# Patient Record
Sex: FEMALE | Race: WHITE | Employment: FULL TIME | URBAN - METROPOLITAN AREA
[De-identification: names, ages, dates, MRNs, and addresses within clinical notes are randomized per-mention and may not be internally consistent; named-entity substitution may affect disease eponyms.]

---

## 2017-11-10 ENCOUNTER — OFFICE VISIT (OUTPATIENT)
Dept: OBGYN CLINIC | Facility: CLINIC | Age: 37
End: 2017-11-10

## 2017-11-10 VITALS
WEIGHT: 168 LBS | HEIGHT: 67.5 IN | DIASTOLIC BLOOD PRESSURE: 80 MMHG | BODY MASS INDEX: 26.06 KG/M2 | SYSTOLIC BLOOD PRESSURE: 132 MMHG

## 2017-11-10 DIAGNOSIS — Z01.419 WOMEN'S ANNUAL ROUTINE GYNECOLOGICAL EXAMINATION: Primary | ICD-10-CM

## 2017-11-10 DIAGNOSIS — Z80.3 FAMILY HISTORY OF BREAST CANCER IN MOTHER: ICD-10-CM

## 2017-11-10 PROCEDURE — 88175 CYTOPATH C/V AUTO FLUID REDO: CPT | Performed by: OBSTETRICS & GYNECOLOGY

## 2017-11-10 PROCEDURE — 99385 PREV VISIT NEW AGE 18-39: CPT | Performed by: OBSTETRICS & GYNECOLOGY

## 2017-11-10 PROCEDURE — 87624 HPV HI-RISK TYP POOLED RSLT: CPT | Performed by: OBSTETRICS & GYNECOLOGY

## 2017-11-10 NOTE — PROGRESS NOTES
GYN H&P     11/10/2017  8:17 AM    CC: Patient is here to establish care    HPI: Patient is a 29year old  LMP here for first PAP and to discuss occasional skipped cycles x 2 last year that have now returned to normal. Never sexually active so no pr unlabored  CARDIOVASCULAR: no peripheral edema or varicosities, skin warm and dry  BREASTS: bilaterally nontender, no palpable masses, no nipple discharge, no skin changes, no axillary adenopathy  ABDOMEN: Soft, non distended; non tender, no masses  GYNE/G

## 2019-04-16 ENCOUNTER — OFFICE VISIT (OUTPATIENT)
Dept: OBGYN CLINIC | Facility: CLINIC | Age: 39
End: 2019-04-16
Payer: COMMERCIAL

## 2019-04-16 ENCOUNTER — HOSPITAL ENCOUNTER (OUTPATIENT)
Dept: MAMMOGRAPHY | Age: 39
Discharge: HOME | End: 2019-04-16
Attending: OBSTETRICS & GYNECOLOGY
Payer: COMMERCIAL

## 2019-04-16 VITALS
HEIGHT: 67.5 IN | WEIGHT: 173.63 LBS | BODY MASS INDEX: 26.93 KG/M2 | SYSTOLIC BLOOD PRESSURE: 110 MMHG | DIASTOLIC BLOOD PRESSURE: 60 MMHG

## 2019-04-16 DIAGNOSIS — Z80.3 FAMILY HISTORY OF BREAST CANCER IN SISTER: ICD-10-CM

## 2019-04-16 DIAGNOSIS — Z80.3 FAMILY HISTORY OF BREAST CANCER IN MOTHER: ICD-10-CM

## 2019-04-16 DIAGNOSIS — Z01.419 WOMEN'S ANNUAL ROUTINE GYNECOLOGICAL EXAMINATION: Primary | ICD-10-CM

## 2019-04-16 PROCEDURE — 77063 BREAST TOMOSYNTHESIS BI: CPT | Performed by: OBSTETRICS & GYNECOLOGY

## 2019-04-16 PROCEDURE — 99395 PREV VISIT EST AGE 18-39: CPT | Performed by: OBSTETRICS & GYNECOLOGY

## 2019-04-16 PROCEDURE — 77067 SCR MAMMO BI INCL CAD: CPT | Performed by: OBSTETRICS & GYNECOLOGY

## 2019-04-16 NOTE — PROGRESS NOTES
GYN ANNUAL    2019  9:14 AM    CC:Patient presents for well woman visit    HPI: Patient is a 41year old  LMP 2019 here for annual gyn exam and order for mammogram. Cycles regular. No pelvic pain. No abnormal vaginal discharge or bleeding. denies dysuria, increased urinary frequency  Hematological/lymphatic: no complaints  Breast: denies rashes, skin changes, pain, lumps or discharge   Psychiatric: no complaints  Endocrine:no complaints  Neurological: no complaints  Immunological: no complai

## 2019-05-22 ENCOUNTER — HOSPITAL ENCOUNTER (OUTPATIENT)
Dept: ULTRASOUND IMAGING | Facility: HOSPITAL | Age: 39
Discharge: HOME | End: 2019-05-22
Attending: OBSTETRICS & GYNECOLOGY
Payer: COMMERCIAL

## 2019-05-22 ENCOUNTER — HOSPITAL ENCOUNTER (OUTPATIENT)
Dept: MAMMOGRAPHY | Facility: HOSPITAL | Age: 39
Discharge: HOME | End: 2019-05-22
Attending: OBSTETRICS & GYNECOLOGY
Payer: COMMERCIAL

## 2019-05-22 DIAGNOSIS — R92.8 ABNORMAL MAMMOGRAM: ICD-10-CM

## 2019-05-22 PROCEDURE — 77066 DX MAMMO INCL CAD BI: CPT | Performed by: OBSTETRICS & GYNECOLOGY

## 2019-05-22 PROCEDURE — 77062 BREAST TOMOSYNTHESIS BI: CPT | Performed by: OBSTETRICS & GYNECOLOGY

## 2019-05-22 PROCEDURE — 76642 ULTRASOUND BREAST LIMITED: CPT | Performed by: OBSTETRICS & GYNECOLOGY

## 2019-05-22 NOTE — IMAGING NOTE
This nurse introduced self and role of breast coordinator. Discussed recommended breast biopsy with patient. Pt was recommended by Dr Chloe Hearn to have a left  breast ultrasound guided biopsy.    Pt history discussed as below: started screening due to stron patient Radiology’s protocol regarding medications, as well as over-the-counter vitamin or herbal supplements, as follows:   -All herbal supplements, Vitamin E, Fish Oil  green tea ,all nsaids like   ibuprofen, Motrin, Advil, Aleve, or other anti-inflammat 5 daps post procedure until the incision is closed and healed. Educated patient on lifting restrictions – nothing heavier than a gallon of milk for 24-48 hours after the procedure.       Discussed with patient that some soreness and bruising is normal aft

## 2019-05-23 ENCOUNTER — TELEPHONE (OUTPATIENT)
Dept: OBGYN CLINIC | Facility: CLINIC | Age: 39
End: 2019-05-23

## 2019-05-23 DIAGNOSIS — N63.0 LUMP OR MASS IN BREAST: Primary | ICD-10-CM

## 2019-05-23 NOTE — PROGRESS NOTES
Spoke with pt mammogram results given and explained to pt that she needs Left breast BX that was ordered by Dr Tierney Richardson. Pt voiced understanding and states she will call [place] to schedule appointment.

## 2019-05-23 NOTE — TELEPHONE ENCOUNTER
----- Message from Dereje Salmon MD sent at 5/22/2019  3:39 PM CDT -----  Your mammogram shows a left breast mass requiring further evaluation. Please proceed with scheduling the recommended left breast biopsy which I have ordered.       Mark Landin

## 2019-05-30 ENCOUNTER — HOSPITAL ENCOUNTER (OUTPATIENT)
Dept: ULTRASOUND IMAGING | Facility: HOSPITAL | Age: 39
Discharge: HOME | End: 2019-05-30
Attending: OBSTETRICS & GYNECOLOGY
Payer: COMMERCIAL

## 2019-05-30 ENCOUNTER — HOSPITAL ENCOUNTER (OUTPATIENT)
Dept: MAMMOGRAPHY | Facility: HOSPITAL | Age: 39
Discharge: HOME | End: 2019-05-30
Attending: OBSTETRICS & GYNECOLOGY
Payer: COMMERCIAL

## 2019-05-30 VITALS — DIASTOLIC BLOOD PRESSURE: 67 MMHG | SYSTOLIC BLOOD PRESSURE: 119 MMHG | HEART RATE: 70 BPM

## 2019-05-30 DIAGNOSIS — N63.20 LEFT BREAST MASS: ICD-10-CM

## 2019-05-30 PROCEDURE — 77065 DX MAMMO INCL CAD UNI: CPT | Performed by: OBSTETRICS & GYNECOLOGY

## 2019-05-30 PROCEDURE — 19083 BX BREAST 1ST LESION US IMAG: CPT | Performed by: OBSTETRICS & GYNECOLOGY

## 2019-05-30 PROCEDURE — 88305 TISSUE EXAM BY PATHOLOGIST: CPT | Performed by: OBSTETRICS & GYNECOLOGY

## 2019-05-30 NOTE — PROCEDURES
[facility]  Procedure Note    Johnna Foster Patient Status:  Outpatient    1980 MRN [id number]   Location [facility] [address] Attending Linda Watkins MD   Hosp Day # 0 PCP None Pcp     Procedure: Left yasmany

## 2019-05-31 NOTE — IMAGING NOTE
. This  nurse  Called at  [address] am contacted [facility]  and informed her  of the following results and recommendations Surgical consultation is recommended given that the size of this mass measures 4.5 cm.          from Dr Jonna Martinez radiologist.  See consultation is recommended given that the size of this mass measures 4.5 cm.       Per report 5/22/2019, given benign histopathology results, 6 month bilateral breast sonographic follow-up is recommended to re-evaluate for interval stability of the probabl

## 2019-06-04 DIAGNOSIS — D24.9 FIBROADENOMA OF BREAST, UNSPECIFIED LATERALITY: Primary | ICD-10-CM

## 2019-06-04 NOTE — PROGRESS NOTES
Results given to pt, per Dr Savage Russian order for breast usn and Dr Dragan Srivastava done in [facility]. Pt states she will call Dr Dragan Srivastava to schedule appointment.

## 2019-07-22 NOTE — PROGRESS NOTES
Breast Surgery New Patient Consultation    This is the first visit for this 41year old woman, referred by Dr. Arron Cruz, who presents for evaluation of left breast mass.     History of Present Illness:   Ms. Kim Grubbs is a 41year old woman wh LEFT Left 2019    Left Fibroadenoma   • WISDOM TEETH REMOVED         Gynecological History:  Pt is a   She achieved menarche at age 17 and LMP 20  She denies any history of oral contraceptive use.   She denies infertility treatment to achieve chest pressure/discomfort, palpitations, irregular heartbeat, fainting or near-fainting, difficulty breathing when lying flat, SOB/Coughing at night, swelling of the legs or chest pain while walking.     Breasts:  See history of present illness    Gastroint kg/m²     Physical Exam:  The patient is an alert, oriented, well-nourished and  well-developed woman who appears her stated age. Her speech patterns and movements are normal. Her affect is appropriate. HEENT: The head is normocephalic.  The neck is supp of breast cancer. Discussion and Plan:  I had a discussion with the Patient regarding her breast exam. On exam today I found a dominant lesion in the left breast at the site of recent biopsy with no other clinical findings bilaterally.   I personally rev

## 2019-07-24 ENCOUNTER — OFFICE VISIT (OUTPATIENT)
Dept: SURGERY | Facility: CLINIC | Age: 39
End: 2019-07-24
Payer: COMMERCIAL

## 2019-07-24 VITALS
SYSTOLIC BLOOD PRESSURE: 138 MMHG | HEIGHT: 67.5 IN | RESPIRATION RATE: 16 BRPM | HEART RATE: 76 BPM | WEIGHT: 174 LBS | DIASTOLIC BLOOD PRESSURE: 93 MMHG | BODY MASS INDEX: 26.99 KG/M2 | OXYGEN SATURATION: 100 %

## 2019-07-24 DIAGNOSIS — N63.20 MASSES OF BOTH BREASTS: ICD-10-CM

## 2019-07-24 DIAGNOSIS — D24.2 FIBROADENOMA, LEFT: Primary | ICD-10-CM

## 2019-07-24 DIAGNOSIS — D24.2 FIBROADENOMA OF BREAST, LEFT: Primary | ICD-10-CM

## 2019-07-24 DIAGNOSIS — N63.10 MASSES OF BOTH BREASTS: ICD-10-CM

## 2019-07-24 PROCEDURE — 99244 OFF/OP CNSLTJ NEW/EST MOD 40: CPT | Performed by: SURGERY

## 2019-07-24 RX ORDER — LORATADINE 10 MG/1
10 TABLET ORAL
COMMUNITY

## 2019-07-24 NOTE — PATIENT INSTRUCTIONS
Surgeon: Dr Parish Reynoso  [phone]  Fax [phone]  Procedure/Surgery: left breast excisional biopsy    [address] [phone]  1.  Someone must accompany you the day of the procedure to drive you

## 2019-08-01 RX ORDER — METOCLOPRAMIDE 10 MG/1
10 TABLET ORAL ONCE
Status: CANCELLED | OUTPATIENT
Start: 2019-08-01 | End: 2019-08-01

## 2019-08-01 RX ORDER — FAMOTIDINE 20 MG/1
20 TABLET ORAL ONCE
Status: CANCELLED | OUTPATIENT
Start: 2019-08-01 | End: 2019-08-01

## 2019-08-05 ENCOUNTER — HOSPITAL ENCOUNTER (OUTPATIENT)
Facility: HOSPITAL | Age: 39
Setting detail: HOSPITAL OUTPATIENT SURGERY
Discharge: HOME | End: 2019-08-05
Attending: SURGERY | Admitting: SURGERY
Payer: COMMERCIAL

## 2019-08-05 ENCOUNTER — ANESTHESIA EVENT (OUTPATIENT)
Dept: SURGERY | Facility: HOSPITAL | Age: 39
End: 2019-08-05
Payer: COMMERCIAL

## 2019-08-05 ENCOUNTER — HOSPITAL ENCOUNTER (OUTPATIENT)
Dept: MAMMOGRAPHY | Facility: HOSPITAL | Age: 39
Discharge: HOME | End: 2019-08-05
Attending: SURGERY
Payer: COMMERCIAL

## 2019-08-05 ENCOUNTER — ANESTHESIA (OUTPATIENT)
Dept: SURGERY | Facility: HOSPITAL | Age: 39
End: 2019-08-05
Payer: COMMERCIAL

## 2019-08-05 VITALS
SYSTOLIC BLOOD PRESSURE: 122 MMHG | RESPIRATION RATE: 18 BRPM | TEMPERATURE: 98 F | WEIGHT: 171 LBS | HEART RATE: 60 BPM | DIASTOLIC BLOOD PRESSURE: 76 MMHG | OXYGEN SATURATION: 100 % | BODY MASS INDEX: 26.84 KG/M2 | HEIGHT: 67 IN

## 2019-08-05 DIAGNOSIS — D24.2 FIBROADENOMA OF BREAST, LEFT: ICD-10-CM

## 2019-08-05 DIAGNOSIS — D24.2 FIBROADENOMA OF LEFT BREAST: ICD-10-CM

## 2019-08-05 LAB — B-HCG UR QL: NEGATIVE

## 2019-08-05 PROCEDURE — 81025 URINE PREGNANCY TEST: CPT

## 2019-08-05 PROCEDURE — 88307 TISSUE EXAM BY PATHOLOGIST: CPT | Performed by: SURGERY

## 2019-08-05 PROCEDURE — 0HBU0ZX EXCISION OF LEFT BREAST, OPEN APPROACH, DIAGNOSTIC: ICD-10-PCS | Performed by: SURGERY

## 2019-08-05 PROCEDURE — 88305 TISSUE EXAM BY PATHOLOGIST: CPT | Performed by: SURGERY

## 2019-08-05 PROCEDURE — 88300 SURGICAL PATH GROSS: CPT | Performed by: SURGERY

## 2019-08-05 PROCEDURE — 76098 X-RAY EXAM SURGICAL SPECIMEN: CPT | Performed by: SURGERY

## 2019-08-05 RX ORDER — MORPHINE SULFATE 10 MG/ML
6 INJECTION, SOLUTION INTRAMUSCULAR; INTRAVENOUS EVERY 10 MIN PRN
Status: DISCONTINUED | OUTPATIENT
Start: 2019-08-05 | End: 2019-08-05

## 2019-08-05 RX ORDER — MORPHINE SULFATE 4 MG/ML
2 INJECTION, SOLUTION INTRAMUSCULAR; INTRAVENOUS EVERY 10 MIN PRN
Status: DISCONTINUED | OUTPATIENT
Start: 2019-08-05 | End: 2019-08-05

## 2019-08-05 RX ORDER — ONDANSETRON 2 MG/ML
4 INJECTION INTRAMUSCULAR; INTRAVENOUS ONCE AS NEEDED
Status: DISCONTINUED | OUTPATIENT
Start: 2019-08-05 | End: 2019-08-05

## 2019-08-05 RX ORDER — NALOXONE HYDROCHLORIDE 0.4 MG/ML
80 INJECTION, SOLUTION INTRAMUSCULAR; INTRAVENOUS; SUBCUTANEOUS AS NEEDED
Status: DISCONTINUED | OUTPATIENT
Start: 2019-08-05 | End: 2019-08-05

## 2019-08-05 RX ORDER — HALOPERIDOL 5 MG/ML
0.25 INJECTION INTRAMUSCULAR ONCE AS NEEDED
Status: DISCONTINUED | OUTPATIENT
Start: 2019-08-05 | End: 2019-08-05

## 2019-08-05 RX ORDER — HYDROCODONE BITARTRATE AND ACETAMINOPHEN 5; 325 MG/1; MG/1
1 TABLET ORAL AS NEEDED
Status: DISCONTINUED | OUTPATIENT
Start: 2019-08-05 | End: 2019-08-05

## 2019-08-05 RX ORDER — SODIUM CHLORIDE, SODIUM LACTATE, POTASSIUM CHLORIDE, CALCIUM CHLORIDE 600; 310; 30; 20 MG/100ML; MG/100ML; MG/100ML; MG/100ML
INJECTION, SOLUTION INTRAVENOUS CONTINUOUS
Status: DISCONTINUED | OUTPATIENT
Start: 2019-08-05 | End: 2019-08-05

## 2019-08-05 RX ORDER — DEXAMETHASONE SODIUM PHOSPHATE 4 MG/ML
VIAL (ML) INJECTION AS NEEDED
Status: DISCONTINUED | OUTPATIENT
Start: 2019-08-05 | End: 2019-08-05 | Stop reason: SURG

## 2019-08-05 RX ORDER — MORPHINE SULFATE 4 MG/ML
4 INJECTION, SOLUTION INTRAMUSCULAR; INTRAVENOUS EVERY 10 MIN PRN
Status: DISCONTINUED | OUTPATIENT
Start: 2019-08-05 | End: 2019-08-05

## 2019-08-05 RX ORDER — ACETAMINOPHEN 500 MG
1000 TABLET ORAL ONCE
Status: COMPLETED | OUTPATIENT
Start: 2019-08-05 | End: 2019-08-05

## 2019-08-05 RX ORDER — ONDANSETRON 2 MG/ML
INJECTION INTRAMUSCULAR; INTRAVENOUS AS NEEDED
Status: DISCONTINUED | OUTPATIENT
Start: 2019-08-05 | End: 2019-08-05 | Stop reason: SURG

## 2019-08-05 RX ORDER — KETOROLAC TROMETHAMINE 30 MG/ML
INJECTION, SOLUTION INTRAMUSCULAR; INTRAVENOUS AS NEEDED
Status: DISCONTINUED | OUTPATIENT
Start: 2019-08-05 | End: 2019-08-05 | Stop reason: SURG

## 2019-08-05 RX ORDER — BUPIVACAINE HYDROCHLORIDE 5 MG/ML
INJECTION, SOLUTION EPIDURAL; INTRACAUDAL AS NEEDED
Status: DISCONTINUED | OUTPATIENT
Start: 2019-08-05 | End: 2019-08-05 | Stop reason: HOSPADM

## 2019-08-05 RX ORDER — MIDAZOLAM HYDROCHLORIDE 1 MG/ML
INJECTION INTRAMUSCULAR; INTRAVENOUS AS NEEDED
Status: DISCONTINUED | OUTPATIENT
Start: 2019-08-05 | End: 2019-08-05 | Stop reason: SURG

## 2019-08-05 RX ORDER — CEFAZOLIN SODIUM/WATER 2 G/20 ML
2 SYRINGE (ML) INTRAVENOUS ONCE
Status: COMPLETED | OUTPATIENT
Start: 2019-08-05 | End: 2019-08-05

## 2019-08-05 RX ORDER — HYDROMORPHONE HYDROCHLORIDE 1 MG/ML
0.6 INJECTION, SOLUTION INTRAMUSCULAR; INTRAVENOUS; SUBCUTANEOUS EVERY 5 MIN PRN
Status: DISCONTINUED | OUTPATIENT
Start: 2019-08-05 | End: 2019-08-05

## 2019-08-05 RX ORDER — PROCHLORPERAZINE EDISYLATE 5 MG/ML
5 INJECTION INTRAMUSCULAR; INTRAVENOUS ONCE AS NEEDED
Status: DISCONTINUED | OUTPATIENT
Start: 2019-08-05 | End: 2019-08-05

## 2019-08-05 RX ORDER — HYDROMORPHONE HYDROCHLORIDE 1 MG/ML
0.2 INJECTION, SOLUTION INTRAMUSCULAR; INTRAVENOUS; SUBCUTANEOUS EVERY 5 MIN PRN
Status: DISCONTINUED | OUTPATIENT
Start: 2019-08-05 | End: 2019-08-05

## 2019-08-05 RX ORDER — LIDOCAINE HYDROCHLORIDE 10 MG/ML
INJECTION, SOLUTION EPIDURAL; INFILTRATION; INTRACAUDAL; PERINEURAL AS NEEDED
Status: DISCONTINUED | OUTPATIENT
Start: 2019-08-05 | End: 2019-08-05 | Stop reason: SURG

## 2019-08-05 RX ORDER — HYDROMORPHONE HYDROCHLORIDE 1 MG/ML
0.4 INJECTION, SOLUTION INTRAMUSCULAR; INTRAVENOUS; SUBCUTANEOUS EVERY 5 MIN PRN
Status: DISCONTINUED | OUTPATIENT
Start: 2019-08-05 | End: 2019-08-05

## 2019-08-05 RX ORDER — HYDROCODONE BITARTRATE AND ACETAMINOPHEN 5; 325 MG/1; MG/1
1-2 TABLET ORAL EVERY 6 HOURS PRN
Qty: 20 TABLET | Refills: 0 | Status: SHIPPED | OUTPATIENT
Start: 2019-08-05 | End: 2019-08-21 | Stop reason: ALTCHOICE

## 2019-08-05 RX ORDER — LIDOCAINE HYDROCHLORIDE AND EPINEPHRINE 10; 10 MG/ML; UG/ML
INJECTION, SOLUTION INFILTRATION; PERINEURAL AS NEEDED
Status: DISCONTINUED | OUTPATIENT
Start: 2019-08-05 | End: 2019-08-05 | Stop reason: HOSPADM

## 2019-08-05 RX ORDER — HYDROCODONE BITARTRATE AND ACETAMINOPHEN 5; 325 MG/1; MG/1
2 TABLET ORAL AS NEEDED
Status: DISCONTINUED | OUTPATIENT
Start: 2019-08-05 | End: 2019-08-05

## 2019-08-05 RX ADMIN — SODIUM CHLORIDE, SODIUM LACTATE, POTASSIUM CHLORIDE, CALCIUM CHLORIDE: 600; 310; 30; 20 INJECTION, SOLUTION INTRAVENOUS at 07:33:00

## 2019-08-05 RX ADMIN — CEFAZOLIN SODIUM/WATER 2 G: 2 G/20 ML SYRINGE (ML) INTRAVENOUS at 07:45:00

## 2019-08-05 RX ADMIN — SODIUM CHLORIDE, SODIUM LACTATE, POTASSIUM CHLORIDE, CALCIUM CHLORIDE: 600; 310; 30; 20 INJECTION, SOLUTION INTRAVENOUS at 08:28:00

## 2019-08-05 RX ADMIN — DEXAMETHASONE SODIUM PHOSPHATE 4 MG: 4 MG/ML VIAL (ML) INJECTION at 07:46:00

## 2019-08-05 RX ADMIN — MIDAZOLAM HYDROCHLORIDE 2 MG: 1 INJECTION INTRAMUSCULAR; INTRAVENOUS at 07:33:00

## 2019-08-05 RX ADMIN — KETOROLAC TROMETHAMINE 30 MG: 30 INJECTION, SOLUTION INTRAMUSCULAR; INTRAVENOUS at 08:23:00

## 2019-08-05 RX ADMIN — LIDOCAINE HYDROCHLORIDE 50 MG: 10 INJECTION, SOLUTION EPIDURAL; INFILTRATION; INTRACAUDAL; PERINEURAL at 07:36:00

## 2019-08-05 RX ADMIN — ONDANSETRON 4 MG: 2 INJECTION INTRAMUSCULAR; INTRAVENOUS at 07:46:00

## 2019-08-05 NOTE — OPERATIVE REPORT
[facility]    PATIENT'S NAME: [address] PHYSICIAN: Shreya Love. Braulio Guthrie MD   OPERATING PHYSICIAN: Shreya Love.  Braulio Guthrie MD   PATIENT ACCOUNT#:   [id number]    LOCATION:  [address] 10  MEDICAL RECORD #:   [id number] with a 15-blade knife in the skin. The lesion was identified and brought into the field and using sharp dissection electrocautery, was excised.   It was sent for placement into the imaging device where specimen x-ray confirmed the presence of the targeted

## 2019-08-05 NOTE — H&P
History of Present Illness:   Ms. Shanna Mendez is a 41year old woman who presents with a left imaging detected breast mass. She had a bilateral diagnostic mammogram on May 22, 2019 which shows breasts are heterogeneously dense.  There are partially ef Probably benign bilateral breast masses, including left breast 02:00 o'clock 4 cm and right breast 11:00 o'clock 3 cm from the nipple.  Six month sonographic follow-up is recommended pending benign biopsy histopathology results.  A left breast ultrasound co Cousin Female 43         She is not of Ashkenazi Buddhist ancestry.     She is single, no children and works as an  at [employer].     Social History:     Alcohol use No                 Smoking status: Never Smoker   Smokeless tobacco: Ne patient denies easily bruising or bleeding or persistent swollen glands or lymph nodes.      Musculoskeletal:  The patient denies muscle aches/pain, joint pain, stiff joints, neck pain, back pain or bone pain.     Neuropsychiatric:  There is no history of nodular. There are no dominant masses in the breast. The axillary tail is normal.  Left breast:   The skin, nipple, and areola appear normal. There is no skin dimpling with movement of the pectoralis. There is no nipple retraction.  No nipple discharge can

## 2019-08-05 NOTE — ANESTHESIA PREPROCEDURE EVALUATION
Anesthesia PreOp Note    HPI:     Toni Kidd is a 41year old female who presents for preoperative consultation requested by: Sherill Peabody, MD    Date of Surgery: 8/5/2019    Procedure(s):  BREAST BIOPSY  Indication: Fibroadenoma of breast, lef Thyroid disease Sister    • Migraines Sister    • Heart Disorder Sister         born with heart defect   • Other (lung cancer) Maternal Cousin Female 43     Social History    Socioeconomic History      Marital status: Single      Spouse name: Not on file Self-Exams: Not Asked    Social History Narrative      No history of abuse      Available pre-op labs reviewed. Lab Results   Component Value Date    URINEPREG Negative 08/05/2019             Vital Signs: Body mass index is 26.78 kg/m².    height is 1.702

## 2019-08-05 NOTE — ANESTHESIA POSTPROCEDURE EVALUATION
Patient: Nora Loo    Procedure Summary     Date:  08/05/19 Room / Location:  [address] [place] [address] MAIN OR 04 / [facility] MAIN OR    Anesthesia Start:  4536 Anesthesia Stop:  0480    Procedure:  BREAST BIOPSY (Left Breast) Diagnosis:       Fibroadenoma of breast, left

## 2019-08-05 NOTE — ANESTHESIA PROCEDURE NOTES
Airway  Urgency: elective      General Information and Staff    Patient location during procedure: OR  Anesthesiologist: Alberteen Ganser, DO  Resident/CRNA: Dain Munguia CRNA  Performed: CRNA     Indications and Patient Condition  Indications for airway m

## 2019-08-09 ENCOUNTER — TELEPHONE (OUTPATIENT)
Dept: SURGERY | Facility: CLINIC | Age: 39
End: 2019-08-09

## 2019-08-09 NOTE — TELEPHONE ENCOUNTER
I contacted the patient to let her know the final pathology showed benign fibroadenoma, and no further treatment is needed at this time. I let her know Dr Nereyda Campbell would go over the recommendation for surveillance at her post op appt.    Pt denies issue wit

## 2019-08-12 PROBLEM — D24.2 BREAST FIBROADENOMA IN FEMALE, LEFT: Status: ACTIVE | Noted: 2019-08-12

## 2019-08-12 PROBLEM — N63.20 LEFT BREAST MASS: Status: ACTIVE | Noted: 2019-08-12

## 2019-08-14 NOTE — PROGRESS NOTES
Breast Surgery Post-Operative Visit    Diagnosis:   Left fibroadenoma status post left excisional biopsy on August 5, 2019. Stage:  N/A    Disease Status:   Surgical therapy complete.      History of Present Illness:   Ms. Bren Muniz is a 41 year further therapy.        Past Medical History:   Diagnosis Date   • Family history of breast cancer in mother        Past Surgical History:   Procedure Laterality Date   • BREAST BIOPSY Left 8/5/2019    Performed by Tanya Chong MD at [address] [place] [address]   • E in mouth/throat, hoarseness, change in voice, facial trauma.     Respiratory:  The patient denies chronic cough, phlegm, hemoptysis, pleurisy/chest pain, pneumonia, asthma, wheezing, difficulty in breathing with exertion, emphysema, chronic bronchitis, shor weight loss/gain, fertility or hormone problems, cold intolerance, thyroid disease. Allergic/Immunologic:  There is no history of hives, hay fever, angioedema or anaphylaxis.     /86 (BP Location: Right arm, Patient Position: Sitting, Cuff Size: a

## 2019-08-21 ENCOUNTER — OFFICE VISIT (OUTPATIENT)
Dept: SURGERY | Facility: CLINIC | Age: 39
End: 2019-08-21
Payer: COMMERCIAL

## 2019-08-21 VITALS
DIASTOLIC BLOOD PRESSURE: 86 MMHG | HEIGHT: 67 IN | RESPIRATION RATE: 16 BRPM | SYSTOLIC BLOOD PRESSURE: 135 MMHG | BODY MASS INDEX: 26.84 KG/M2 | OXYGEN SATURATION: 68 % | HEART RATE: 68 BPM | WEIGHT: 171 LBS

## 2019-08-21 DIAGNOSIS — N63.20 MASSES OF BOTH BREASTS: Primary | ICD-10-CM

## 2019-08-21 DIAGNOSIS — N63.10 MASSES OF BOTH BREASTS: Primary | ICD-10-CM

## 2019-08-21 DIAGNOSIS — D24.2 FIBROADENOMA, LEFT: ICD-10-CM

## 2019-08-21 PROCEDURE — 99024 POSTOP FOLLOW-UP VISIT: CPT | Performed by: SURGERY

## 2020-01-31 ENCOUNTER — TELEPHONE (OUTPATIENT)
Dept: OBGYN CLINIC | Facility: CLINIC | Age: 40
End: 2020-01-31

## 2020-01-31 NOTE — TELEPHONE ENCOUNTER
Notes from pt's last office visit with Dr. Gricel Bro on 8/21/2019:    \" We did discuss that she had multiple other imaging detected findings bilateral for which a six-month bilateral diagnostic surveillance in the very 2020 is recommended.      Return in abou

## 2020-02-20 ENCOUNTER — HOSPITAL ENCOUNTER (OUTPATIENT)
Dept: MAMMOGRAPHY | Facility: HOSPITAL | Age: 40
Discharge: HOME | End: 2020-02-20
Attending: SURGERY
Payer: COMMERCIAL

## 2020-02-20 DIAGNOSIS — N63.20 MASSES OF BOTH BREASTS: ICD-10-CM

## 2020-02-20 DIAGNOSIS — N63.10 MASSES OF BOTH BREASTS: ICD-10-CM

## 2020-02-20 PROCEDURE — 77062 BREAST TOMOSYNTHESIS BI: CPT | Performed by: SURGERY

## 2020-02-20 PROCEDURE — 77066 DX MAMMO INCL CAD BI: CPT | Performed by: SURGERY

## 2020-02-21 ENCOUNTER — TELEPHONE (OUTPATIENT)
Dept: MAMMOGRAPHY | Facility: HOSPITAL | Age: 40
End: 2020-02-21

## 2020-02-21 NOTE — TELEPHONE ENCOUNTER
Received notice that Miss Frankie Chen will need to return for an ultrasound based off previous recommendations. She had a dx mammogram but no ultrasound was done at the time.  Dr Rao Mckeon discussed with Dr Akanksha Vivas and was decided that [facility] s

## 2020-02-25 ENCOUNTER — TELEPHONE (OUTPATIENT)
Dept: MAMMOGRAPHY | Facility: HOSPITAL | Age: 40
End: 2020-02-25

## 2020-02-25 NOTE — TELEPHONE ENCOUNTER
Attempted to reach Esperanza Chavez to assit in getting her scheduled for her ultrasound no answer left message to call back to [phone]

## 2020-02-27 ENCOUNTER — TELEPHONE (OUTPATIENT)
Dept: SURGERY | Facility: CLINIC | Age: 40
End: 2020-02-27

## 2020-02-27 NOTE — TELEPHONE ENCOUNTER
[facility] for patient to review that Dr Christina Church had recommended sumi breast US as part of her imaging surveillance, as well as the mammogram.   Apologized it was not done at the same time as her mammogram, and asked her to call Marysol Kessler in [address] at [facility]

## 2020-02-28 ENCOUNTER — TELEPHONE (OUTPATIENT)
Dept: MAMMOGRAPHY | Facility: HOSPITAL | Age: 40
End: 2020-02-28

## 2020-02-28 NOTE — TELEPHONE ENCOUNTER
Kim Grubbs returned call .  After  Dr Nereyda Campbell reviewed images with Dr Sasha Mchugh it was determine that an ultrasound should have been done day of diagnostic mammogram. Dr Nereyda Campbell discussed with Dr Sasha Mchugh and was determine to have Kim Grubbs return

## 2020-03-04 ENCOUNTER — HOSPITAL ENCOUNTER (OUTPATIENT)
Dept: ULTRASOUND IMAGING | Facility: HOSPITAL | Age: 40
Discharge: HOME | End: 2020-03-04
Attending: OBSTETRICS & GYNECOLOGY
Payer: COMMERCIAL

## 2020-03-04 DIAGNOSIS — D24.9 FIBROADENOMA OF BREAST, UNSPECIFIED LATERALITY: ICD-10-CM

## 2020-03-04 PROCEDURE — 76642 ULTRASOUND BREAST LIMITED: CPT | Performed by: OBSTETRICS & GYNECOLOGY

## 2020-03-10 ENCOUNTER — TELEPHONE (OUTPATIENT)
Dept: OBGYN CLINIC | Facility: CLINIC | Age: 40
End: 2020-03-10

## 2020-03-10 DIAGNOSIS — N63.0 BREAST MASS SEEN ON MAMMOGRAM: Primary | ICD-10-CM

## 2020-03-10 NOTE — TELEPHONE ENCOUNTER
----- Message from Elvie Gentile MD sent at 3/5/2020  2:01 PM CST -----  Your bilateral breast ultrasounds show stable and likely benign findings. Please schedule a repeat bilateral breast USN in 6 months.   Shilpa Gamboa MD    LM on pt's voicemail t

## 2020-03-10 NOTE — PROGRESS NOTES
Released to [employer]. Orders for repeat breast usn done is epic. See 03/10/2020 telephone encounter.

## 2020-09-11 ENCOUNTER — HOSPITAL ENCOUNTER (OUTPATIENT)
Dept: ULTRASOUND IMAGING | Facility: HOSPITAL | Age: 40
Discharge: HOME | End: 2020-09-11
Attending: OBSTETRICS & GYNECOLOGY
Payer: COMMERCIAL

## 2020-09-11 DIAGNOSIS — N63.0 BREAST MASS SEEN ON MAMMOGRAM: ICD-10-CM

## 2020-09-11 PROCEDURE — 76642 ULTRASOUND BREAST LIMITED: CPT | Performed by: OBSTETRICS & GYNECOLOGY

## 2021-06-29 ENCOUNTER — ORDER TRANSCRIPTION (OUTPATIENT)
Dept: ADMINISTRATIVE | Facility: HOSPITAL | Age: 41
End: 2021-06-29

## 2021-06-29 DIAGNOSIS — Z12.31 ENCOUNTER FOR SCREENING MAMMOGRAM FOR MALIGNANT NEOPLASM OF BREAST: Primary | ICD-10-CM

## 2022-01-24 ENCOUNTER — TELEPHONE (OUTPATIENT)
Dept: OBGYN CLINIC | Facility: CLINIC | Age: 42
End: 2022-01-24

## 2022-01-24 DIAGNOSIS — Z12.31 VISIT FOR SCREENING MAMMOGRAM: Primary | ICD-10-CM

## 2022-01-24 NOTE — TELEPHONE ENCOUNTER
Pt requesting new referral for mammogram order - states central scheduling is not able to use the current one ordered by different provider.

## 2022-01-26 ENCOUNTER — HOSPITAL ENCOUNTER (OUTPATIENT)
Dept: MAMMOGRAPHY | Facility: HOSPITAL | Age: 42
Discharge: HOME | End: 2022-01-26
Attending: OBSTETRICS & GYNECOLOGY
Payer: COMMERCIAL

## 2022-01-26 ENCOUNTER — TELEPHONE (OUTPATIENT)
Dept: OBGYN CLINIC | Facility: CLINIC | Age: 42
End: 2022-01-26

## 2022-01-26 DIAGNOSIS — N63.0 MASS OF BREAST, UNSPECIFIED LATERALITY: Primary | ICD-10-CM

## 2022-01-26 DIAGNOSIS — Z12.31 VISIT FOR SCREENING MAMMOGRAM: ICD-10-CM

## 2022-01-26 NOTE — TELEPHONE ENCOUNTER
Pt states mammogram order should be for bilateral diagnostic w/ USN order because a lump was found. Please notify pt of new order to be scheduled.

## 2022-02-01 ENCOUNTER — HOSPITAL ENCOUNTER (OUTPATIENT)
Dept: ULTRASOUND IMAGING | Facility: HOSPITAL | Age: 42
Discharge: HOME | End: 2022-02-01
Attending: OBSTETRICS & GYNECOLOGY
Payer: COMMERCIAL

## 2022-02-01 ENCOUNTER — HOSPITAL ENCOUNTER (OUTPATIENT)
Dept: MAMMOGRAPHY | Facility: HOSPITAL | Age: 42
Discharge: HOME | End: 2022-02-01
Attending: OBSTETRICS & GYNECOLOGY
Payer: COMMERCIAL

## 2022-02-01 DIAGNOSIS — N63.0 MASS OF BREAST, UNSPECIFIED LATERALITY: ICD-10-CM

## 2022-02-01 PROCEDURE — 77066 DX MAMMO INCL CAD BI: CPT | Performed by: OBSTETRICS & GYNECOLOGY

## 2022-02-01 PROCEDURE — 76642 ULTRASOUND BREAST LIMITED: CPT | Performed by: OBSTETRICS & GYNECOLOGY

## 2022-02-01 PROCEDURE — 77062 BREAST TOMOSYNTHESIS BI: CPT | Performed by: OBSTETRICS & GYNECOLOGY

## 2022-02-03 ENCOUNTER — HOSPITAL ENCOUNTER (OUTPATIENT)
Dept: MAMMOGRAPHY | Facility: HOSPITAL | Age: 42
Discharge: HOME | End: 2022-02-03
Attending: OBSTETRICS & GYNECOLOGY
Payer: COMMERCIAL

## 2022-02-03 ENCOUNTER — HOSPITAL ENCOUNTER (OUTPATIENT)
Dept: ULTRASOUND IMAGING | Facility: HOSPITAL | Age: 42
Discharge: HOME | End: 2022-02-03
Attending: OBSTETRICS & GYNECOLOGY
Payer: COMMERCIAL

## 2022-02-03 DIAGNOSIS — R59.9 ENLARGED LYMPH NODE: ICD-10-CM

## 2022-02-03 DIAGNOSIS — N63.20 BREAST MASS, LEFT: ICD-10-CM

## 2022-02-03 PROCEDURE — 88360 TUMOR IMMUNOHISTOCHEM/MANUAL: CPT | Performed by: OBSTETRICS & GYNECOLOGY

## 2022-02-03 PROCEDURE — 19084 BX BREAST ADD LESION US IMAG: CPT | Performed by: OBSTETRICS & GYNECOLOGY

## 2022-02-03 PROCEDURE — 38505 NEEDLE BIOPSY LYMPH NODES: CPT | Performed by: OBSTETRICS & GYNECOLOGY

## 2022-02-03 PROCEDURE — 19083 BX BREAST 1ST LESION US IMAG: CPT | Performed by: OBSTETRICS & GYNECOLOGY

## 2022-02-03 PROCEDURE — 76942 ECHO GUIDE FOR BIOPSY: CPT | Performed by: OBSTETRICS & GYNECOLOGY

## 2022-02-03 PROCEDURE — 88342 IMHCHEM/IMCYTCHM 1ST ANTB: CPT | Performed by: OBSTETRICS & GYNECOLOGY

## 2022-02-03 PROCEDURE — 88305 TISSUE EXAM BY PATHOLOGIST: CPT | Performed by: OBSTETRICS & GYNECOLOGY

## 2022-02-03 PROCEDURE — 77065 DX MAMMO INCL CAD UNI: CPT | Performed by: OBSTETRICS & GYNECOLOGY

## 2022-02-03 NOTE — PROCEDURES
[facility]  Procedure Note    Era Felty Patient Status:  Outpatient    1980 MRN [id number]   Location [address] Attending Mychal Willett MD   Hosp Day # 0 PCP None Pcp     Procedure: 1. Left breast ultrasound guided biopsy 2 sites  2.  Left axillary lymph node ultrasound guided biopsy    Pre-Procedure Diagnosis:  Left breast masses and Left axillary LAD    Post-Procedure Diagnosis:  Left breast masses and Left axillary LAD    Anesthesia:  Local    Findings:   Left breast masses and Left axillary LAD    Specimens: multiple cores at each site    Blood Loss:  minimal    Tourniquet Time: none  Complications:  None  Drains:  none    Kathie Ford MD  2/3/2022

## 2022-02-03 NOTE — IMAGING NOTE
Pt arrived to room #4 .  scans taken by Merle Rodriguez  ultrasound technologist     Hx taken and is as follows: hx birads 5       CONCLUSION:    1. Ultrasound-guided biopsy of the palpable mass at 02:00 o'clock, 5 centimeters from the nipple is recommended for complete evaluation. 2. Ultrasound-guided biopsy of the palpable mass at 02:00 o'clock, 4 centimeters from the nipple is recommended for complete evaluation. 3. Ultrasound-guided biopsy of left axillary lymph node with cortical thickening is recommended for complete evaluation. 4. Previously seen sonographic findings in bilateral breasts at 11:00 o'clock in the right breast and 02:00 o'clock in the left breast have been stable for at least two years, and are consistent with benignity. These findings and recommendations were discussed with and acknowledged by the patient before she left the department. BI-RADS CATEGORY:     DIAGNOSTIC CATEGORY 5--HIGHLY SUGGESTIVE OF MALIGNANCY. RECOMMENDATIONS:   ULTRASOUND-GUIDED CORE BIOPSY: LEFT BREAST  x2 AND AXILLA                        Dictated by (CST): Betsey Morrow DO on 2/01/2022 at 10:49 AM       Finalized by (CST): Betsey Morrow DO on 2/01/2022 at 10:50 AM               800 am  Consent verified and obtained     810 am Imaging Completed by Dr Frances Raya     813 am Time out taken     815 am  Skin prep with chloro prep sterile towels to site. Site marked 5Lidocaine  1% 10 milligrams per ml given from kit  total amount  3  ml given. 815 am Lidocaine 1% with epinephrine  1:100,000 units 200 milligrams per  20 ml given total amount 3 ml given. All cores to be placed in sterile cup with 10 ml normal saline   Site #1  Left breast 2 o'clock 5 cm from nipple   Core # 1 at 820 am      Total amount cores taken 6 placed in formalin at 823 am    823 am vision  Clip placed     824 am  Procedure completed. Site #2  Left breast 2 o'clock 4 cm from nipple    New sterile cup for site #2 . All cores to be placed in a sterile cup with 10 ml normal saline. Skin prep with chloro prep sterile towels to site. Lidocaine  1% 10 milligrams per ml given from kit  total amount  5 ml given. Lidocaine 1% with epinephrine  1:1000,000 units 200 milligrams per  20 ml given total amount 3 ml given. Core # 1 at 825 am       Total amount cores taken 6  placed in formalin at 828 am     Q Clip placed   Site #3   Left axillary lymph node   New sterile cup for site #3 . All cores to be placed in a sterile cup with 10 ml normal saline. 829 am Skin prep with chloro prep sterile towels to site. 829 am Lidocaine  1% 10 milligrams per ml given from kit  total amount   5  ml given. 829 am Lidocaine 1% with epinephrine  1:1000,000 units 200 milligrams per  20 ml given total amount  3 ml given. Core # 1 at 830 am      total amount cores taken  4 cores taken  placed in formalin at 832 am     832 am  hydromarker coil 832 am             833 am Procedure completed. Pressure to site . No active bleeding noted. Area cleaned steri strips to site. Ice pack to site . 835 am Post instructions given verbal et written. Avs summary sheet provided to patient. Patient verbalizes understanding and agreement . 837 am  Specimens  taken to pathology by Sherene Kayser Rn given to [facility] 3 cores sterile cups. To mammography department  for post clip images by Emi Manual . Mammography technologist. Mammography department to discharge patient after images completed.

## 2022-02-04 ENCOUNTER — TELEPHONE (OUTPATIENT)
Dept: HEMATOLOGY/ONCOLOGY | Facility: HOSPITAL | Age: 42
End: 2022-02-04

## 2022-02-04 ENCOUNTER — TELEPHONE (OUTPATIENT)
Dept: MAMMOGRAPHY | Facility: HOSPITAL | Age: 42
End: 2022-02-04

## 2022-02-04 ENCOUNTER — TELEPHONE (OUTPATIENT)
Dept: OBGYN CLINIC | Facility: CLINIC | Age: 42
End: 2022-02-04

## 2022-02-04 NOTE — TELEPHONE ENCOUNTER
Follow up call post Bx no c/o post procedure. She is going to a meeting  now requested we call her back in 40 minutes to discuss results and recommendations. She was informed I would call her back with [facility] our breast navigator   to go over results with her . Myranda German  verbalizes understanding agreement.

## 2022-02-04 NOTE — TELEPHONE ENCOUNTER
Spoke with patient and offered support concerning breast biopsy results. Has appointments scheduled to see Dr. Massimo Ricci and Dr. Lalo Sanchez to begin treatment.

## 2022-02-04 NOTE — TELEPHONE ENCOUNTER
Patient is s/p left breast biopsy, performed at [facility] on 2/3/22. Patient has been referred to the Breast Biopsy Result Clinic for discussion of pathology results. Phoned patient in conference with Jayne Brasher RN Breast Care Coordinator. Malignant pathology results communicated to patient. Questions answered to the best of my ability. Shared with patient my role as Navigator, to provide support and education, assist with her care coordination, as well as connect her to supportive resources as she may need them. Discussed with patient Dr. America Pardo has referred her to Dr. Porsha Sam and Dr. Dara Shirley for further management. Appointments scheduled with Dr. Dara Shirley for Wednesday 2/9/22 at 12:30, and with Dr. Porsha Sam for 2/14/22 at 12:30. All appointment information was provided to patient. Provided patient with my contact information, and encouraged her to call with any questions, concerns, or needs.

## 2022-02-09 ENCOUNTER — OFFICE VISIT (OUTPATIENT)
Dept: HEMATOLOGY/ONCOLOGY | Facility: HOSPITAL | Age: 42
End: 2022-02-09
Attending: INTERNAL MEDICINE
Payer: COMMERCIAL

## 2022-02-09 VITALS
HEIGHT: 67.5 IN | OXYGEN SATURATION: 98 % | HEART RATE: 84 BPM | DIASTOLIC BLOOD PRESSURE: 102 MMHG | WEIGHT: 196 LBS | SYSTOLIC BLOOD PRESSURE: 142 MMHG | TEMPERATURE: 99 F | RESPIRATION RATE: 16 BRPM | BODY MASS INDEX: 30.4 KG/M2

## 2022-02-09 DIAGNOSIS — C50.412 MALIGNANT NEOPLASM OF UPPER-OUTER QUADRANT OF LEFT BREAST IN FEMALE, ESTROGEN RECEPTOR POSITIVE (HCC): Primary | ICD-10-CM

## 2022-02-09 DIAGNOSIS — C77.3 METASTASIS IN 1 TO 3 AXILLARY LYMPH NODES WITH AT LEAST ONE TUMOR DEPOSIT GREATER THAN 2.0 MM FROM NEOPLASM OF BREAST DETERMINED BY HISTOPATHOLOGIC EXAMINATION (PN1A) (HCC): ICD-10-CM

## 2022-02-09 DIAGNOSIS — Z01.810 PREOPERATIVE CARDIOVASCULAR EXAMINATION: ICD-10-CM

## 2022-02-09 DIAGNOSIS — Z15.01 FAMILY HISTORY OF LI-FRAUMENI SYNDROME: ICD-10-CM

## 2022-02-09 DIAGNOSIS — Z17.0 MALIGNANT NEOPLASM OF UPPER-OUTER QUADRANT OF LEFT BREAST IN FEMALE, ESTROGEN RECEPTOR POSITIVE (HCC): Primary | ICD-10-CM

## 2022-02-09 DIAGNOSIS — C50.919 METASTASIS IN 1 TO 3 AXILLARY LYMPH NODES WITH AT LEAST ONE TUMOR DEPOSIT GREATER THAN 2.0 MM FROM NEOPLASM OF BREAST DETERMINED BY HISTOPATHOLOGIC EXAMINATION (PN1A) (HCC): ICD-10-CM

## 2022-02-09 PROCEDURE — 99245 OFF/OP CONSLTJ NEW/EST HI 55: CPT | Performed by: INTERNAL MEDICINE

## 2022-02-10 ENCOUNTER — HOSPITAL ENCOUNTER (OUTPATIENT)
Dept: CV DIAGNOSTICS | Facility: HOSPITAL | Age: 42
Discharge: HOME | End: 2022-02-10
Attending: INTERNAL MEDICINE
Payer: COMMERCIAL

## 2022-02-10 ENCOUNTER — NURSE NAVIGATOR ENCOUNTER (OUTPATIENT)
Dept: HEMATOLOGY/ONCOLOGY | Facility: HOSPITAL | Age: 42
End: 2022-02-10

## 2022-02-10 ENCOUNTER — HOSPITAL ENCOUNTER (OUTPATIENT)
Dept: CT IMAGING | Facility: HOSPITAL | Age: 42
Discharge: HOME | End: 2022-02-10
Attending: INTERNAL MEDICINE
Payer: COMMERCIAL

## 2022-02-10 DIAGNOSIS — Z17.0 MALIGNANT NEOPLASM OF UPPER-OUTER QUADRANT OF LEFT BREAST IN FEMALE, ESTROGEN RECEPTOR POSITIVE (HCC): ICD-10-CM

## 2022-02-10 DIAGNOSIS — C77.3 METASTASIS IN 1 TO 3 AXILLARY LYMPH NODES WITH AT LEAST ONE TUMOR DEPOSIT GREATER THAN 2.0 MM FROM NEOPLASM OF BREAST DETERMINED BY HISTOPATHOLOGIC EXAMINATION (PN1A) (HCC): ICD-10-CM

## 2022-02-10 DIAGNOSIS — C50.412 MALIGNANT NEOPLASM OF UPPER-OUTER QUADRANT OF LEFT BREAST IN FEMALE, ESTROGEN RECEPTOR POSITIVE (HCC): ICD-10-CM

## 2022-02-10 DIAGNOSIS — C50.919 METASTASIS IN 1 TO 3 AXILLARY LYMPH NODES WITH AT LEAST ONE TUMOR DEPOSIT GREATER THAN 2.0 MM FROM NEOPLASM OF BREAST DETERMINED BY HISTOPATHOLOGIC EXAMINATION (PN1A) (HCC): ICD-10-CM

## 2022-02-10 DIAGNOSIS — Z01.810 PREOPERATIVE CARDIOVASCULAR EXAMINATION: ICD-10-CM

## 2022-02-10 LAB — CREAT BLD-MCNC: 0.8 MG/DL

## 2022-02-10 PROCEDURE — 71260 CT THORAX DX C+: CPT | Performed by: INTERNAL MEDICINE

## 2022-02-10 PROCEDURE — 74177 CT ABD & PELVIS W/CONTRAST: CPT | Performed by: INTERNAL MEDICINE

## 2022-02-10 PROCEDURE — 93306 TTE W/DOPPLER COMPLETE: CPT | Performed by: INTERNAL MEDICINE

## 2022-02-10 PROCEDURE — 82565 ASSAY OF CREATININE: CPT

## 2022-02-11 ENCOUNTER — HOSPITAL ENCOUNTER (OUTPATIENT)
Dept: MRI IMAGING | Facility: HOSPITAL | Age: 42
Discharge: HOME | End: 2022-02-11
Attending: INTERNAL MEDICINE
Payer: COMMERCIAL

## 2022-02-11 ENCOUNTER — TELEPHONE (OUTPATIENT)
Dept: HEMATOLOGY/ONCOLOGY | Facility: HOSPITAL | Age: 42
End: 2022-02-11

## 2022-02-11 DIAGNOSIS — Z15.01 FAMILY HISTORY OF LI-FRAUMENI SYNDROME: ICD-10-CM

## 2022-02-11 DIAGNOSIS — Z17.0 MALIGNANT NEOPLASM OF UPPER-OUTER QUADRANT OF LEFT BREAST IN FEMALE, ESTROGEN RECEPTOR POSITIVE (HCC): ICD-10-CM

## 2022-02-11 DIAGNOSIS — C50.412 MALIGNANT NEOPLASM OF UPPER-OUTER QUADRANT OF LEFT BREAST IN FEMALE, ESTROGEN RECEPTOR POSITIVE (HCC): ICD-10-CM

## 2022-02-11 PROCEDURE — 77049 MRI BREAST C-+ W/CAD BI: CPT | Performed by: INTERNAL MEDICINE

## 2022-02-11 PROCEDURE — A9575 INJ GADOTERATE MEGLUMI 0.1ML: HCPCS | Performed by: INTERNAL MEDICINE

## 2022-02-11 NOTE — TELEPHONE ENCOUNTER
Message left for patient to return call to discuss CT scan results and address any questions or concerns.

## 2022-02-11 NOTE — PROGRESS NOTES
Patient presents to [address] [place] [address] for scheduled CT scan and strain echo. Met with patient in CT holding. Introduced myself to patient as Breast RN Navigator. Shared with patient my role to provide support and education, assist with care coordination, as well as connect her to supportive resources as she may need them. Discussed with patient scheduling breast MRI. Patient agreeable to tomorrow at 10:30am.  Educated patient as to this exam and what to expect. All appointment information provided to patient as well. Patient has discussed with Dr. Vincent Schmidt, her wish to pursue a second opinion. Patient's preference is [facility]. Shared with patient I have established communication with the patient intake, and their representative will be reaching out to her for appointment scheduling with Dr. Dexter Bradshaw. Provided patient with supportive resource information to include [employer], and [employer]. Provided patient with Breast Cancer Treatment Handbook as well and educated as to how to use this resource. Patient is scheduled to meet with Dr. Flaco Hamilton on Monday 2/14. Encouraged patient to call with any questions concerns or needs.

## 2022-02-14 ENCOUNTER — OFFICE VISIT (OUTPATIENT)
Dept: SURGERY | Facility: CLINIC | Age: 42
End: 2022-02-14
Payer: COMMERCIAL

## 2022-02-14 ENCOUNTER — TELEPHONE (OUTPATIENT)
Dept: HEMATOLOGY/ONCOLOGY | Facility: HOSPITAL | Age: 42
End: 2022-02-14

## 2022-02-14 VITALS
BODY MASS INDEX: 30.38 KG/M2 | DIASTOLIC BLOOD PRESSURE: 108 MMHG | HEIGHT: 67.5 IN | HEART RATE: 114 BPM | TEMPERATURE: 98 F | WEIGHT: 195.81 LBS | SYSTOLIC BLOOD PRESSURE: 138 MMHG | OXYGEN SATURATION: 98 % | RESPIRATION RATE: 16 BRPM

## 2022-02-14 DIAGNOSIS — C50.412 MALIGNANT NEOPLASM OF UPPER-OUTER QUADRANT OF LEFT BREAST IN FEMALE, ESTROGEN RECEPTOR POSITIVE (HCC): Primary | ICD-10-CM

## 2022-02-14 DIAGNOSIS — Z17.0 MALIGNANT NEOPLASM OF UPPER-OUTER QUADRANT OF LEFT BREAST IN FEMALE, ESTROGEN RECEPTOR POSITIVE (HCC): Primary | ICD-10-CM

## 2022-02-14 PROCEDURE — 3080F DIAST BP >= 90 MM HG: CPT | Performed by: SURGERY

## 2022-02-14 PROCEDURE — 99215 OFFICE O/P EST HI 40 MIN: CPT | Performed by: SURGERY

## 2022-02-14 PROCEDURE — 3075F SYST BP GE 130 - 139MM HG: CPT | Performed by: SURGERY

## 2022-02-14 PROCEDURE — 3008F BODY MASS INDEX DOCD: CPT | Performed by: SURGERY

## 2022-02-15 ENCOUNTER — TELEPHONE (OUTPATIENT)
Dept: ULTRASOUND IMAGING | Facility: HOSPITAL | Age: 42
End: 2022-02-15

## 2022-02-15 NOTE — TELEPHONE ENCOUNTER
Called to inform Claire smith oc needed Procedure explained questions answered. She has been off all blood thinning meds 1 week. She was offered Thursday for both procedures if she wanted to get them done on same day. She verbalizes she would like to switch from Wed to Thursday . . she was instructed to check in at 1045 for dx main after mri Bx was done she would go to [facility] to have her rosario loc at 1 pm. Claire Diane verbalizes understanding and agreement. Sirena 86 contacted Jeanie Null to change appt from Wednesday to Thursday. Time slot approved by Niyah Galo lead Mri.

## 2022-02-17 ENCOUNTER — HOSPITAL ENCOUNTER (OUTPATIENT)
Dept: MRI IMAGING | Facility: HOSPITAL | Age: 42
Discharge: HOME | End: 2022-02-17
Attending: INTERNAL MEDICINE
Payer: COMMERCIAL

## 2022-02-17 ENCOUNTER — HOSPITAL ENCOUNTER (OUTPATIENT)
Dept: MAMMOGRAPHY | Facility: HOSPITAL | Age: 42
Discharge: HOME | End: 2022-02-17
Attending: INTERNAL MEDICINE
Payer: COMMERCIAL

## 2022-02-17 ENCOUNTER — HOSPITAL ENCOUNTER (OUTPATIENT)
Dept: ULTRASOUND IMAGING | Facility: HOSPITAL | Age: 42
Discharge: HOME | End: 2022-02-17
Attending: SURGERY
Payer: COMMERCIAL

## 2022-02-17 DIAGNOSIS — Z17.0 MALIGNANT NEOPLASM OF UPPER-OUTER QUADRANT OF LEFT BREAST IN FEMALE, ESTROGEN RECEPTOR POSITIVE (HCC): ICD-10-CM

## 2022-02-17 DIAGNOSIS — R92.8 ABNORMAL FINDINGS ON DIAGNOSTIC IMAGING OF BREAST: ICD-10-CM

## 2022-02-17 DIAGNOSIS — C50.412 MALIGNANT NEOPLASM OF UPPER-OUTER QUADRANT OF LEFT BREAST IN FEMALE, ESTROGEN RECEPTOR POSITIVE (HCC): ICD-10-CM

## 2022-02-17 PROCEDURE — A9575 INJ GADOTERATE MEGLUMI 0.1ML: HCPCS | Performed by: INTERNAL MEDICINE

## 2022-02-17 PROCEDURE — 19085 BX BREAST 1ST LESION MR IMAG: CPT | Performed by: INTERNAL MEDICINE

## 2022-02-17 PROCEDURE — 88305 TISSUE EXAM BY PATHOLOGIST: CPT | Performed by: INTERNAL MEDICINE

## 2022-02-17 PROCEDURE — 77066 DX MAMMO INCL CAD BI: CPT | Performed by: INTERNAL MEDICINE

## 2022-02-17 PROCEDURE — 19285 PERQ DEV BREAST 1ST US IMAG: CPT | Performed by: SURGERY

## 2022-02-17 NOTE — PROCEDURES
[facility]  Procedure Note    Je De Luna Patient Status:  Outpatient    1980 MRN [id number]   Location [facility] MRI Attending Jermaine Young MD   Hosp Day # 0 PCP None Pcp     Procedure: Right breast MRI guided biopsy    Pre-Procedure Diagnosis:  Right breast mass    Post-Procedure Diagnosis: Right breast mass    Anesthesia:  Local    Findings:  Right breast mass    Specimens: multiple cores    Blood Loss:  minimal    Tourniquet Time: none  Complications:  None  Drains:  none    Rao Delgadillo MD  2022

## 2022-02-17 NOTE — IMAGING NOTE
To Radiology holding area hx as follow: Family history of Li-Fraumeni syndrome. There is an oval mass in the central right breast, measuring 0.8 x 0.7 x 0.6 centimeters. This is best seen on series 11698, image 80. There is progressive enhancement. MRI guided biopsy of this mass is recommended for complete evaluation. 1115  IV 22 gauge started by [facility] RN    46 Dr Minor Hall here procedure explained questions answered . 1134 Consent verified and obtained  Site marked RIGHT Breast    1136 Time out taken    1141 Patient to mri table   scans taken. 1155 Betadine as skin prep. 1156 Lidocaine 1% 10 milligrams per ml 5 ml given. 1156 Lidocaine 1% with epinephrine 1:100,000 units 200 milligrams per 20 ml  Total amount given 15ml. 1157  9 Gauge needle placed     1205 Sampling begins     1208  Sampling completed. 1208 Formalin added to samples    STOPLIGHT Breast clip placed at 1209. Procedure complete pressure to site 10 minutes . Area  Cleaned steri strips to site. Ice pack to site. Iv removed by MRI staff. 1235 Post instructions given verbal et written. Pt verbalizes understanidng and agreement. 1238 To mammography department for post clip images. Report to [facility] mammography technologist.    WHEN Mammogram completed. Bb nipple marker TO BE removed from nipple by MAMMO STAFF. Bra applied by patient. A 6 \" ace wrap secured over Bra TO BE APPLIED IN MAMMOGRAPHY.     1240 Cores taken to pathology by [facility] RN    PT TO [facility] MAMMOGRAPHY DEPT

## 2022-02-17 NOTE — PROCEDURES
[facility]  Procedure Note    Roddy Yang Patient Status:  Outpatient    1980 MRN [id number]   Location [address] Attending Ruben Reyes MD   Hosp Day # 0 PCP None Pcp     Procedure: Left axillary ultrasound guided ELI clip placement    Pre-Procedure Diagnosis:  Metastatic Left axillary lymph node    Post-Procedure Diagnosis: Metastatic Left axillary lymph node    Anesthesia:  Local    Findings:  Metastatic Left axillary lymph node    Specimens: n/a    Blood Loss:  minimal    Tourniquet Time: none  Complications:  None  Drains:  none    Charley Perla MD  2022

## 2022-02-24 ENCOUNTER — TELEPHONE (OUTPATIENT)
Dept: HEMATOLOGY/ONCOLOGY | Facility: HOSPITAL | Age: 42
End: 2022-02-24

## 2022-02-24 NOTE — TELEPHONE ENCOUNTER
Message left for patient following second opinion, to discuss decisions regarding her care, as well as any questions or concerns she may have. Will await return call.

## 2022-02-28 ENCOUNTER — TELEPHONE (OUTPATIENT)
Dept: HEMATOLOGY/ONCOLOGY | Facility: HOSPITAL | Age: 42
End: 2022-02-28

## (undated) DIAGNOSIS — C50.412 MALIGNANT NEOPLASM OF UPPER-OUTER QUADRANT OF LEFT BREAST IN FEMALE, ESTROGEN RECEPTOR POSITIVE (HCC): ICD-10-CM

## (undated) DIAGNOSIS — R92.8 ABNORMAL FINDINGS ON DIAGNOSTIC IMAGING OF BREAST: Primary | ICD-10-CM

## (undated) DIAGNOSIS — Z17.0 MALIGNANT NEOPLASM OF UPPER-OUTER QUADRANT OF LEFT BREAST IN FEMALE, ESTROGEN RECEPTOR POSITIVE (HCC): ICD-10-CM

## (undated) DEVICE — SUTURE SILK 2-0 FS

## (undated) DEVICE — MINOR GENERAL: Brand: MEDLINE INDUSTRIES, INC.

## (undated) DEVICE — VEST SRG 3 MED CUP R/L

## (undated) DEVICE — CAUTERY BLADE 2IN INS E1455

## (undated) DEVICE — CONTAINER SPEC STR 4OZ GRY LID

## (undated) DEVICE — DRAPE TAPE: Brand: CONVERTORS

## (undated) DEVICE — ABDOMINAL PAD: Brand: CURITY

## (undated) DEVICE — DRAPE PACK CHEST & U BAR

## (undated) DEVICE — 12 ML SYRINGE LUER-LOCK TIP: Brand: MONOJECT

## (undated) DEVICE — SUTURE MONOCRYL 4-0 PS-2

## (undated) DEVICE — ENCORE® PERRY STYLE 42 PF SIZE 6.5, STERILE LATEX POWDER-FREE SURGICAL GLOVE: Brand: ENCORE

## (undated) DEVICE — FLEXIBLE YANKAUER,MEDIUM TIP, NO VACUUM CONTROL: Brand: ARGYLE

## (undated) DEVICE — SUTURE VICRYL 3-0 SH

## (undated) NOTE — LETTER
[address] [place] [address]  Authorization for Surgical Operation or Procedure  Date: ______________       Time: _______________  1.  I hereby authorize Dr. Kathy Stokes, my physician and the assistant, to perform the following operation and/ 5. I consent to the photographing of the operations or procedures to be performed for the purposes of advancing medicine, science, and/or education, provided my identity is not revealed.  If the procedure has been videotaped, the physician/surgeon will obta risks and benefits involved in the proposed treatment and any reasonable alternative to the proposed treatment. I have also explained the risks and benefits involved in the refusal of the proposed treatment and have answered the patient's questions.  If I h

## (undated) NOTE — LETTER
[address] [place] [address]  Authorization for Surgical Operation or Procedure  Date: ______________       Time: _______________  1. I hereby authorize Dr. Jackie Matthews, my physician and the assistant, to perform the following operation and/or procedure:   Ultrasound guided left breast biopsy times two( 2 )sites with clip placement and left axillary lymph node biopsy with clip placementon Alycia Ambrosio at [facility].    2. My physician has explained the nature and purpose of the operation or other procedure, possible alternative methods of treatment, the risks involved and the possibility of complications to me. I understand the probable consequences of declining the recommended procedure and the alternative methods of treatment. I acknowledge that no guarantee has been made as to the result that may be obtained. 3. I recognize that during the course of this operation or other procedure, unforeseen conditions may necessitate additional or different procedures than those listed above. I, therefore, further authorize and request that the above-named physician, his/her physician assistants, or designees perform such procedures as are, in hist/her professional opinion, necessary and desirable. 4. Should the need arise during my operation or immediate post-operative period; I also consent to the administration of blood and/or blood products. Further, I understand that despite careful testing and screening of blood and blood products by collecting agencies, I may still be subject to ill effects as a result of receiving a blood transfusion and/or blood products. The following are some, but not all, of the potential risks that can occur: fever and allergic reactions, hemolytic reactions, transmission of diseases such as hepatitis, AIDS, and cytomegalovirus (CMV), and fluid overload.  In the event that I wish to have an autologous transfusion of my own blood, or a directed donor transfusion, I will discuss this with my physician. 5. I consent to the photographing of the operations or procedures to be performed for the purposes of advancing medicine, science, and/or education, provided my identity is not revealed. If the procedure has been videotaped, the physician/surgeon will obtain the original videotape. The [facility] will not be responsible for storage or maintenance of this tape. 6. I consent to the presence of a  or observer as deemed necessary by my physician or his/her designees. 7. Any tissues or organs removed in the operation or other procedure may be disposed of by and at the discretion of [facility].    8. I understand that the physician and his/her physician assistants are not employees or agents of [facility], [facility], nor [facility], but are independent medical practitioners who have been permitted to use its facilities for the care and treatment of their patients. 9. Patients having a sterilization procedure: I understand that if the procedure is successful the results will be permanent and it will therefore be impossible for me to inseminate, conceive, or bear children. I also understand that the procedure is intended to result in sterility, although the result has not been guaranteed. 10. I CERTIFY THAT I HAVE READ AND FULLY UNDERSTAND THE ABOVE CONSENT TO OPERATION and/or OTHER PROCEDURE.      Signature of Patient:   _______________________________________________________________  Responsible person in case of minor, unconscious: ________________________________________  Relationship to patient: ______________________________________________________________    Signature of Witness: _________________________________Date: ___________Time: __________    Statement of Physician: My signature below affirms that prior to the time of the procedure, I have explained to the patient and/or her guardian, the risks and benefits involved in the proposed treatment and any reasonable alternative to the proposed treatment. I have also explained the risks and benefits involved in the refusal of the proposed treatment and have answered the patient's questions. If I have a significant financial interest in a co-management agreement or a significant financial interest in any product or implant, or other significant relationship used in the procedure/surgery, I have disclosed this and had a discussion with my patient.   Signature of Physician:   _________________________________Date:_____________Time:________    Patient Name: Neeraj Lemon : 1980  Printed: 2022   Medical Record #: [id number]

## (undated) NOTE — LETTER
[address] [place] [address]  Authorization for Surgical Operation or Procedure  Date: ______________       Time: _______________  1. I hereby authorize Dr. Jesu Long , my physician and the assistant, to perform the following operation and/or procedure:  Ultrasound guided needle localization of the [facility] reflector seed to the left axillary lymph node with hydromark coil clip  on Darrel Juneau at [facility].    2. My physician has explained the nature and purpose of the operation or other procedure, possible alternative methods of treatment, the risks involved and the possibility of complications to me. I understand the probable consequences of declining the recommended procedure and the alternative methods of treatment. I acknowledge that no guarantee has been made as to the result that may be obtained. 3. I recognize that during the course of this operation or other procedure, unforeseen conditions may necessitate additional or different procedures than those listed above. I, therefore, further authorize and request that the above-named physician, his/her physician assistants, or designees perform such procedures as are, in hist/her professional opinion, necessary and desirable. 4. Should the need arise during my operation or immediate post-operative period; I also consent to the administration of blood and/or blood products. Further, I understand that despite careful testing and screening of blood and blood products by collecting agencies, I may still be subject to ill effects as a result of receiving a blood transfusion and/or blood products. The following are some, but not all, of the potential risks that can occur: fever and allergic reactions, hemolytic reactions, transmission of diseases such as hepatitis, AIDS, and cytomegalovirus (CMV), and fluid overload.  In the event that I wish to have an autologous transfusion of my own blood, or a directed donor transfusion, I will discuss this with my physician. 5. I consent to the photographing of the operations or procedures to be performed for the purposes of advancing medicine, science, and/or education, provided my identity is not revealed. If the procedure has been videotaped, the physician/surgeon will obtain the original videotape. The [facility] will not be responsible for storage or maintenance of this tape. 6. I consent to the presence of a  or observer as deemed necessary by my physician or his/her designees. 7. Any tissues or organs removed in the operation or other procedure may be disposed of by and at the discretion of [facility].    8. I understand that the physician and his/her physician assistants are not employees or agents of [facility], [facility], nor [facility], but are independent medical practitioners who have been permitted to use its facilities for the care and treatment of their patients. 9. Patients having a sterilization procedure: I understand that if the procedure is successful the results will be permanent and it will therefore be impossible for me to inseminate, conceive, or bear children. I also understand that the procedure is intended to result in sterility, although the result has not been guaranteed. 10. I CERTIFY THAT I HAVE READ AND FULLY UNDERSTAND THE ABOVE CONSENT TO OPERATION and/or OTHER PROCEDURE.      Signature of Patient:   _______________________________________________________________  Responsible person in case of minor, unconscious: ________________________________________  Relationship to patient: ______________________________________________________________    Signature of Witness: _________________________________Date: ___________Time: __________    Statement of Physician: My signature below affirms that prior to the time of the procedure, I have explained to the patient and/or her guardian, the risks and benefits involved in the proposed treatment and any reasonable alternative to the proposed treatment. I have also explained the risks and benefits involved in the refusal of the proposed treatment and have answered the patient's questions. If I have a significant financial interest in a co-management agreement or a significant financial interest in any product or implant, or other significant relationship used in the procedure/surgery, I have disclosed this and had a discussion with my patient.   Signature of Physician:   _________________________________Date:_____________Time:________    Patient Name: Jesse Ledesma : 1980  Printed: 2022   Medical Record #: [id number]

## (undated) NOTE — LETTER
[facility], [place]  Authorization for Surgical Operation or Procedure  1. I hereby authorize Dr. Mariela Alarcon , my physician and the assistant, to perform the following operation and/or procedure:  MAGNETIC RESONANCE IMAGE GUIDED RIGHT BREAST BIOPSY WITH CLIP PLACEMENT on Orion Winter at [facility].    2. My physician has explained the nature and purpose of the operation or other procedure, possible alternative methods of treatment, the risks involved and the possibility of complications to me. I understand the probable consequences of declining the recommended procedure and the alternative methods of treatment. I acknowledge that no guarantee has been made as to the result that may be obtained. 3. I recognize that during the course of this operation or other procedure, unforeseen conditions may necessitate additional or different procedures than those listed above. I, therefore, further authorize and request that the above-named physician, his/her physician assistants, or designees perform such procedures as are, in hist/her professional opinion, necessary and desirable. 4. Should the need arise during my operation or immediate post-operative period; I also consent to the administration of blood and/or blood products. Further, I understand that despite careful testing and screening of blood and blood products by collecting agencies, I may still be subject to ill effects as a result of receiving a blood transfusion and/or blood products. The following are some, but not all, of the potential risks that can occur: fever and allergic reactions, hemolytic reactions, transmission of diseases such as hepatitis, AIDS, and cytomegalovirus (CMV), and fluid overload. In the event that I wish to have an autologous transfusion of my own blood, or a directed donor transfusion, I will discuss this with my physician.     5. I consent to the photographing of the operations or procedures to be performed for the purposes of advancing medicine, science, and/or education, provided my identity is not revealed. If the procedure has been videotaped, the physician/surgeon will obtain the original videotape. The [facility] will not be responsible for storage or maintenance of this tape. 6. I consent to the presence of a  or observer as deemed necessary by my physician or his/her designees. 7. Any tissues or organs removed in the operation or other procedure may be disposed of by and at the discretion of [facility].    8. I understand that the physician and his/her physician assistants are not employees or agents of [facility], [facility], nor [facility], but are independent medical practitioners who have been permitted to use its facilities for the care and treatment of their patients. 9. Patients having a sterilization procedure: I understand that if the procedure is successful the results will be permanent and it will therefore be impossible for me to inseminate, conceive, or bear children. I also understand that the procedure is intended to result in sterility, although the result has not been guaranteed. 10. I CERTIFY THAT I HAVE READ AND FULLY UNDERSTAND THE ABOVE CONSENT TO OPERATION and/or OTHER PROCEDURE.      Signature of Patient:   _______________________________________________________________  Responsible person in case of minor, unconscious: ________________________________________  Relationship to patient: ______________________________________________________________    Signature of Witness: _________________________________Date: ___________Time: __________    Statement of Physician: My signature below affirms that prior to the time of the procedure, I have explained to the patient and/or her guardian, the risks and benefits involved in the proposed treatment and any reasonable alternative to the proposed treatment. I have also explained the risks and benefits involved in the refusal of the proposed treatment and have answered the patient's questions. If I have a significant financial interest in a co-management agreement or a significant financial interest in any product or implant, or other significant relationship used in the procedure/surgery, I have disclosed this and had a discussion with my patient.   Signature of Physician:   _________________________________Date:_____________Time:________    Patient Name: Magdi Perez : 1980  Printed: 2022  Medical Record #: [id number]